# Patient Record
Sex: FEMALE | Race: BLACK OR AFRICAN AMERICAN | NOT HISPANIC OR LATINO | URBAN - METROPOLITAN AREA
[De-identification: names, ages, dates, MRNs, and addresses within clinical notes are randomized per-mention and may not be internally consistent; named-entity substitution may affect disease eponyms.]

---

## 2017-05-22 ENCOUNTER — OFFICE (OUTPATIENT)
Dept: URBAN - METROPOLITAN AREA CLINIC 75 | Facility: CLINIC | Age: 72
End: 2017-05-22

## 2017-05-22 VITALS
HEIGHT: 64 IN | SYSTOLIC BLOOD PRESSURE: 128 MMHG | HEART RATE: 72 BPM | DIASTOLIC BLOOD PRESSURE: 60 MMHG | WEIGHT: 155 LBS

## 2017-05-22 DIAGNOSIS — R10.13 EPIGASTRIC PAIN: ICD-10-CM

## 2017-05-22 DIAGNOSIS — Z86.010 PERSONAL HISTORY OF COLONIC POLYPS: ICD-10-CM

## 2017-05-22 DIAGNOSIS — K59.00 CONSTIPATION, UNSPECIFIED: ICD-10-CM

## 2017-05-22 DIAGNOSIS — R10.12 LEFT UPPER QUADRANT PAIN: ICD-10-CM

## 2017-05-22 PROCEDURE — 99214 OFFICE O/P EST MOD 30 MIN: CPT | Performed by: INTERNAL MEDICINE

## 2017-05-22 RX ORDER — OMEPRAZOLE 20 MG/1
CAPSULE, DELAYED RELEASE ORAL
Qty: 60 | Refills: 6 | Status: COMPLETED
End: 2019-10-04

## 2017-05-25 VITALS
SYSTOLIC BLOOD PRESSURE: 167 MMHG | HEIGHT: 64 IN | HEART RATE: 94 BPM | RESPIRATION RATE: 14 BRPM | DIASTOLIC BLOOD PRESSURE: 100 MMHG | SYSTOLIC BLOOD PRESSURE: 147 MMHG | OXYGEN SATURATION: 100 % | RESPIRATION RATE: 16 BRPM | HEART RATE: 64 BPM | TEMPERATURE: 96.9 F | WEIGHT: 155 LBS | HEART RATE: 63 BPM | HEART RATE: 60 BPM | SYSTOLIC BLOOD PRESSURE: 139 MMHG | HEART RATE: 66 BPM | DIASTOLIC BLOOD PRESSURE: 76 MMHG | OXYGEN SATURATION: 96 % | DIASTOLIC BLOOD PRESSURE: 77 MMHG | RESPIRATION RATE: 27 BRPM | SYSTOLIC BLOOD PRESSURE: 155 MMHG | OXYGEN SATURATION: 99 % | OXYGEN SATURATION: 60 % | DIASTOLIC BLOOD PRESSURE: 67 MMHG | SYSTOLIC BLOOD PRESSURE: 140 MMHG | SYSTOLIC BLOOD PRESSURE: 146 MMHG | HEART RATE: 68 BPM | DIASTOLIC BLOOD PRESSURE: 71 MMHG | DIASTOLIC BLOOD PRESSURE: 75 MMHG | HEART RATE: 65 BPM | OXYGEN SATURATION: 98 % | TEMPERATURE: 98.6 F | OXYGEN SATURATION: 94 %

## 2017-05-30 ENCOUNTER — AMBULATORY SURGICAL CENTER (OUTPATIENT)
Dept: URBAN - METROPOLITAN AREA SURGERY 17 | Facility: SURGERY | Age: 72
End: 2017-05-30
Payer: MEDICARE

## 2017-05-30 ENCOUNTER — OFFICE (OUTPATIENT)
Dept: URBAN - METROPOLITAN AREA CLINIC 64 | Facility: CLINIC | Age: 72
End: 2017-05-30
Payer: MEDICARE

## 2017-05-30 DIAGNOSIS — R10.13 EPIGASTRIC PAIN: ICD-10-CM

## 2017-05-30 DIAGNOSIS — K29.50 UNSPECIFIED CHRONIC GASTRITIS WITHOUT BLEEDING: ICD-10-CM

## 2017-05-30 DIAGNOSIS — R10.12 LEFT UPPER QUADRANT PAIN: ICD-10-CM

## 2017-05-30 DIAGNOSIS — K44.9 DIAPHRAGMATIC HERNIA WITHOUT OBSTRUCTION OR GANGRENE: ICD-10-CM

## 2017-05-30 DIAGNOSIS — K29.70 GASTRITIS, UNSPECIFIED, WITHOUT BLEEDING: ICD-10-CM

## 2017-05-30 DIAGNOSIS — K21.0 GASTRO-ESOPHAGEAL REFLUX DISEASE WITH ESOPHAGITIS: ICD-10-CM

## 2017-05-30 DIAGNOSIS — B96.81 HELICOBACTER PYLORI [H. PYLORI] AS THE CAUSE OF DISEASES CLA: ICD-10-CM

## 2017-05-30 LAB
GI HISTOLOGY: A. UNSPECIFIED: (no result)
GI HISTOLOGY: B. UNSPECIFIED: (no result)
GI HISTOLOGY: C. SELECT: (no result)
GI HISTOLOGY: D. UNSPECIFIED: (no result)
GI HISTOLOGY: PDF REPORT: (no result)

## 2017-05-30 PROCEDURE — 88342 IMHCHEM/IMCYTCHM 1ST ANTB: CPT | Performed by: INTERNAL MEDICINE

## 2017-05-30 PROCEDURE — 88341 IMHCHEM/IMCYTCHM EA ADD ANTB: CPT | Performed by: INTERNAL MEDICINE

## 2017-05-30 PROCEDURE — 88305 TISSUE EXAM BY PATHOLOGIST: CPT | Performed by: INTERNAL MEDICINE

## 2017-05-30 PROCEDURE — 43239 EGD BIOPSY SINGLE/MULTIPLE: CPT | Performed by: INTERNAL MEDICINE

## 2017-05-30 RX ADMIN — LIDOCAINE HYDROCHLORIDE 50 MG: 10 INJECTION, SOLUTION EPIDURAL; INFILTRATION; INTRACAUDAL; PERINEURAL at 10:57

## 2017-05-30 RX ADMIN — PROPOFOL 100 MG: 10 INJECTION, EMULSION INTRAVENOUS at 10:57

## 2017-05-30 RX ADMIN — PROPOFOL 25 MG: 10 INJECTION, EMULSION INTRAVENOUS at 11:00

## 2017-06-20 VITALS
RESPIRATION RATE: 10 BRPM | DIASTOLIC BLOOD PRESSURE: 74 MMHG | HEART RATE: 64 BPM | HEART RATE: 62 BPM | RESPIRATION RATE: 15 BRPM | DIASTOLIC BLOOD PRESSURE: 76 MMHG | TEMPERATURE: 97.1 F | OXYGEN SATURATION: 94 % | SYSTOLIC BLOOD PRESSURE: 139 MMHG | DIASTOLIC BLOOD PRESSURE: 94 MMHG | DIASTOLIC BLOOD PRESSURE: 80 MMHG | RESPIRATION RATE: 13 BRPM | HEART RATE: 60 BPM | DIASTOLIC BLOOD PRESSURE: 49 MMHG | OXYGEN SATURATION: 100 % | HEART RATE: 57 BPM | SYSTOLIC BLOOD PRESSURE: 135 MMHG | HEART RATE: 61 BPM | SYSTOLIC BLOOD PRESSURE: 134 MMHG | RESPIRATION RATE: 18 BRPM | HEART RATE: 58 BPM | DIASTOLIC BLOOD PRESSURE: 62 MMHG | DIASTOLIC BLOOD PRESSURE: 97 MMHG | SYSTOLIC BLOOD PRESSURE: 188 MMHG | DIASTOLIC BLOOD PRESSURE: 96 MMHG | DIASTOLIC BLOOD PRESSURE: 58 MMHG | OXYGEN SATURATION: 97 % | RESPIRATION RATE: 11 BRPM | WEIGHT: 155 LBS | SYSTOLIC BLOOD PRESSURE: 140 MMHG | RESPIRATION RATE: 9 BRPM | TEMPERATURE: 95 F | OXYGEN SATURATION: 99 % | SYSTOLIC BLOOD PRESSURE: 173 MMHG | SYSTOLIC BLOOD PRESSURE: 97 MMHG | SYSTOLIC BLOOD PRESSURE: 165 MMHG | HEART RATE: 55 BPM | HEART RATE: 56 BPM | DIASTOLIC BLOOD PRESSURE: 69 MMHG | SYSTOLIC BLOOD PRESSURE: 157 MMHG | HEIGHT: 64 IN | DIASTOLIC BLOOD PRESSURE: 71 MMHG | RESPIRATION RATE: 16 BRPM | SYSTOLIC BLOOD PRESSURE: 120 MMHG

## 2017-06-21 ENCOUNTER — AMBULATORY SURGICAL CENTER (OUTPATIENT)
Dept: URBAN - METROPOLITAN AREA SURGERY 17 | Facility: SURGERY | Age: 72
End: 2017-06-21
Payer: MEDICARE

## 2017-06-21 DIAGNOSIS — Z86.010 PERSONAL HISTORY OF COLONIC POLYPS: ICD-10-CM

## 2017-06-21 DIAGNOSIS — K57.30 DIVERTICULOSIS OF LARGE INTESTINE WITHOUT PERFORATION OR ABS: ICD-10-CM

## 2017-06-21 DIAGNOSIS — K64.8 OTHER HEMORRHOIDS: ICD-10-CM

## 2017-06-21 DIAGNOSIS — K59.00 CONSTIPATION, UNSPECIFIED: ICD-10-CM

## 2017-06-21 PROBLEM — Z12.11 SURVEILLANCE DUE TO PRIOR COLONIC NEOPLASIA: Status: ACTIVE | Noted: 2017-06-21

## 2017-06-21 PROCEDURE — 45378 DIAGNOSTIC COLONOSCOPY: CPT | Performed by: INTERNAL MEDICINE

## 2017-06-21 RX ADMIN — PROPOFOL 50 MG: 10 INJECTION, EMULSION INTRAVENOUS at 09:59

## 2017-06-21 RX ADMIN — PROPOFOL 50 MG: 10 INJECTION, EMULSION INTRAVENOUS at 10:05

## 2017-06-21 RX ADMIN — PROPOFOL 100 MG: 10 INJECTION, EMULSION INTRAVENOUS at 09:49

## 2017-06-21 RX ADMIN — PROPOFOL 25 MG: 10 INJECTION, EMULSION INTRAVENOUS at 09:55

## 2017-06-21 RX ADMIN — LIDOCAINE HYDROCHLORIDE 25 MG: 10 INJECTION, SOLUTION EPIDURAL; INFILTRATION; INTRACAUDAL; PERINEURAL at 09:49

## 2017-06-21 RX ADMIN — PROPOFOL 50 MG: 10 INJECTION, EMULSION INTRAVENOUS at 09:57

## 2017-06-21 RX ADMIN — PROPOFOL 25 MG: 10 INJECTION, EMULSION INTRAVENOUS at 09:52

## 2019-10-04 ENCOUNTER — OFFICE (OUTPATIENT)
Dept: URBAN - METROPOLITAN AREA CLINIC 75 | Facility: CLINIC | Age: 74
End: 2019-10-04

## 2019-10-04 VITALS
SYSTOLIC BLOOD PRESSURE: 112 MMHG | DIASTOLIC BLOOD PRESSURE: 73 MMHG | HEART RATE: 61 BPM | WEIGHT: 157 LBS | HEIGHT: 64 IN

## 2019-10-04 DIAGNOSIS — K57.90 DIVERTICULOSIS OF INTESTINE, PART UNSPECIFIED, WITHOUT PERFO: ICD-10-CM

## 2019-10-04 DIAGNOSIS — K59.00 CONSTIPATION, UNSPECIFIED: ICD-10-CM

## 2019-10-04 DIAGNOSIS — D50.0 IRON DEFICIENCY ANEMIA SECONDARY TO BLOOD LOSS (CHRONIC): ICD-10-CM

## 2019-10-04 DIAGNOSIS — R19.4 CHANGE IN BOWEL HABIT: ICD-10-CM

## 2019-10-04 DIAGNOSIS — I10 ESSENTIAL (PRIMARY) HYPERTENSION: ICD-10-CM

## 2019-10-04 DIAGNOSIS — D57.3 SICKLE-CELL TRAIT: ICD-10-CM

## 2019-10-04 DIAGNOSIS — R11.0 NAUSEA: ICD-10-CM

## 2019-10-04 PROCEDURE — 99214 OFFICE O/P EST MOD 30 MIN: CPT | Performed by: INTERNAL MEDICINE

## 2019-10-04 RX ORDER — CIPROFLOXACIN 500 MG/1
1000 TABLET, FILM COATED ORAL
Qty: 20 | Refills: 0 | Status: COMPLETED
Start: 2019-10-04 | End: 2020-04-29

## 2019-10-04 RX ORDER — METRONIDAZOLE 250 MG/1
750 TABLET, FILM COATED ORAL
Qty: 30 | Refills: 0 | Status: COMPLETED
Start: 2019-10-04 | End: 2020-04-29

## 2020-04-20 ENCOUNTER — INPATIENT HOSPITAL (OUTPATIENT)
Dept: URBAN - METROPOLITAN AREA HOSPITAL 107 | Facility: HOSPITAL | Age: 75
End: 2020-04-20
Payer: MEDICARE

## 2020-04-20 DIAGNOSIS — R93.3 ABNORMAL FINDINGS ON DIAGNOSTIC IMAGING OF OTHER PARTS OF DI: ICD-10-CM

## 2020-04-20 DIAGNOSIS — R11.2 NAUSEA WITH VOMITING, UNSPECIFIED: ICD-10-CM

## 2020-04-20 DIAGNOSIS — R10.84 GENERALIZED ABDOMINAL PAIN: ICD-10-CM

## 2020-04-20 PROCEDURE — 99223 1ST HOSP IP/OBS HIGH 75: CPT | Performed by: INTERNAL MEDICINE

## 2020-04-21 ENCOUNTER — INPATIENT HOSPITAL (OUTPATIENT)
Dept: URBAN - METROPOLITAN AREA HOSPITAL 107 | Facility: HOSPITAL | Age: 75
End: 2020-04-21
Payer: MEDICARE

## 2020-04-21 DIAGNOSIS — R10.84 GENERALIZED ABDOMINAL PAIN: ICD-10-CM

## 2020-04-21 DIAGNOSIS — K52.9 NONINFECTIVE GASTROENTERITIS AND COLITIS, UNSPECIFIED: ICD-10-CM

## 2020-04-21 DIAGNOSIS — K57.30 DIVERTICULOSIS OF LARGE INTESTINE WITHOUT PERFORATION OR ABS: ICD-10-CM

## 2020-04-21 PROCEDURE — 99232 SBSQ HOSP IP/OBS MODERATE 35: CPT | Performed by: PHYSICIAN ASSISTANT

## 2020-04-22 ENCOUNTER — INPATIENT HOSPITAL (OUTPATIENT)
Dept: URBAN - METROPOLITAN AREA HOSPITAL 107 | Facility: HOSPITAL | Age: 75
End: 2020-04-22
Payer: MEDICARE

## 2020-04-22 DIAGNOSIS — K56.609 UNSPECIFIED INTESTINAL OBSTRUCTION, UNSPECIFIED AS TO PARTIA: ICD-10-CM

## 2020-04-22 DIAGNOSIS — K57.30 DIVERTICULOSIS OF LARGE INTESTINE WITHOUT PERFORATION OR ABS: ICD-10-CM

## 2020-04-22 DIAGNOSIS — K52.9 NONINFECTIVE GASTROENTERITIS AND COLITIS, UNSPECIFIED: ICD-10-CM

## 2020-04-22 DIAGNOSIS — R10.84 GENERALIZED ABDOMINAL PAIN: ICD-10-CM

## 2020-04-22 PROCEDURE — 99231 SBSQ HOSP IP/OBS SF/LOW 25: CPT | Performed by: NURSE PRACTITIONER

## 2020-04-23 PROCEDURE — 99231 SBSQ HOSP IP/OBS SF/LOW 25: CPT | Performed by: NURSE PRACTITIONER

## 2020-04-24 PROCEDURE — 99231 SBSQ HOSP IP/OBS SF/LOW 25: CPT | Performed by: NURSE PRACTITIONER

## 2020-04-29 VITALS — HEIGHT: 64 IN | WEIGHT: 150 LBS

## 2020-04-30 ENCOUNTER — OFFICE (OUTPATIENT)
Dept: URBAN - METROPOLITAN AREA CLINIC 75 | Facility: CLINIC | Age: 75
End: 2020-04-30
Payer: MEDICARE

## 2020-04-30 DIAGNOSIS — K59.00 CONSTIPATION, UNSPECIFIED: ICD-10-CM

## 2020-04-30 DIAGNOSIS — R10.12 LEFT UPPER QUADRANT PAIN: ICD-10-CM

## 2020-04-30 DIAGNOSIS — R11.0 NAUSEA: ICD-10-CM

## 2020-04-30 DIAGNOSIS — Z86.010 PERSONAL HISTORY OF COLONIC POLYPS: ICD-10-CM

## 2020-04-30 DIAGNOSIS — R10.13 EPIGASTRIC PAIN: ICD-10-CM

## 2020-04-30 PROCEDURE — 99214 OFFICE O/P EST MOD 30 MIN: CPT | Mod: 95 | Performed by: INTERNAL MEDICINE

## 2020-04-30 RX ORDER — DICYCLOMINE HYDROCHLORIDE 10 MG/1
20 CAPSULE ORAL
Qty: 50 | Refills: 5 | Status: ACTIVE

## 2020-07-29 VITALS — WEIGHT: 158 LBS | HEIGHT: 64 IN

## 2020-07-30 ENCOUNTER — OFFICE (OUTPATIENT)
Dept: URBAN - METROPOLITAN AREA CLINIC 75 | Facility: CLINIC | Age: 75
End: 2020-07-30
Payer: MEDICARE

## 2020-07-30 DIAGNOSIS — K29.70 GASTRITIS, UNSPECIFIED, WITHOUT BLEEDING: ICD-10-CM

## 2020-07-30 DIAGNOSIS — R10.12 LEFT UPPER QUADRANT PAIN: ICD-10-CM

## 2020-07-30 DIAGNOSIS — R19.7 DIARRHEA, UNSPECIFIED: ICD-10-CM

## 2020-07-30 DIAGNOSIS — K57.30 DIVERTICULOSIS OF LARGE INTESTINE WITHOUT PERFORATION OR ABS: ICD-10-CM

## 2020-07-30 DIAGNOSIS — K64.8 OTHER HEMORRHOIDS: ICD-10-CM

## 2020-07-30 DIAGNOSIS — Z86.010 PERSONAL HISTORY OF COLONIC POLYPS: ICD-10-CM

## 2020-07-30 DIAGNOSIS — R11.0 NAUSEA: ICD-10-CM

## 2020-07-30 DIAGNOSIS — K44.9 DIAPHRAGMATIC HERNIA WITHOUT OBSTRUCTION OR GANGRENE: ICD-10-CM

## 2020-07-30 DIAGNOSIS — R10.13 EPIGASTRIC PAIN: ICD-10-CM

## 2020-07-30 DIAGNOSIS — K59.00 CONSTIPATION, UNSPECIFIED: ICD-10-CM

## 2020-07-30 PROCEDURE — G2012 BRIEF CHECK IN BY MD/QHP: HCPCS | Mod: 95 | Performed by: NURSE PRACTITIONER

## 2020-07-30 NOTE — SERVICEHPINOTES
MAINOR GILLESPIE is seen today for a follow-up visit. Office visit note from 7/16/14..............................I last evaluated this patient in the office on 4/7/2014. At that time she complained of pain in the epigastric region and the left upper quadrant. She had a total of 3 episodes of the pain over the past one year. Her current episode of pain had been present for approximately 3 weeks. On one episode of pain in the past, she was residing in [place] and she was treated for H. pylori. She's never had any type of workup for the pain, such as an EGD or CT scan. She had associated nausea but no vomiting. The pain in the epigastric region seemed to increase when she was placed on omeprazole. She was on ranitidine for one week, and the pain was 50 - 75% improved. She described the pain as a dull, aching sensation. She had frequent belching. She had burning in the substernal area, which she considered reflux on nearly a daily basis. She had no dysphagia.Her bowel movements are unchanged. She has occasional mild constipation. She states she had a colonoscopy 8-10 years ago, which revealed a possible polyp. She normally has one bowel movement per day. She has no melena or bright red blood per rectum.She has chronic anemia due to sickle cell trait. Her normal hemoglobin ranges between 10-11.CBC from 3/11/2014, revealed hemoglobin 10 with an MCV of 73. Her white count is 5.2, and platelet count 292,000. Her CMP reveals an AST of 12, ALT 8, alkaline phosphatase 55, and bilirubin 0.3. Her cholesterol is 215.This patient underwent an EGD on 4/30/2014 that revealed gastritis and a hiatal hernia. Biopsies were positive for H. pylori, and I treated her with Alinia and Biaxin. She's had complete resolution of her upper GI complaints since then. She remains on ranitidine. CT scan of her abdomen revealed atherosclerosis and diverticulosis.Patient had a TIA one month ago with numbness on the right side of her body that resolved after 24 hours.Office visit from 5/22/17.............................One month ago, Ms. Gillespie developed pain in the epigastric region and left upper quadrant. She described this as an aching pain and it occasionally became sharp. She states that a KUB was obtained and she was told that she had a large amount of gas in the intestines. She was not placed on any medications for the pain. She also states that the x-ray revealed some constipation. She started taking Miralax and it offered her no relief. She then started taking Colace every morning and she has been having bowel movements on a daily basis for the past 5 days. When she was experiencing the left upper quadrant pain and epigastric pain she had an 8-day period with no bowel movement. Prior to the onset of her symptoms, her bowel movements were occurring daily.Her left upper quadrant pain is resolved, unless she eats certain foods where it will recur. Her epigastric pain is less intense, but still present. She has no reflux symptoms or dysphagia.I asked about her Hydrocodone precipitating the episode of constipation. She did take 2 Hydrocodone preceding this event, but she took no more of the Hydrocodone after that.CBC reveals hemoglobin of 9.6, WC 6.5, and platelet count 160,000. This patient has sickle cell trait and maintains hemoglobin between 9 and 10. CMP reveals bili of 0.6, AST 26, ALT 21, alk phos 68, and albumin 5.4. Lipase was normal at 119. CT scan of the abdomen from 5/3/17 reveals sigmoid diverticulosis, but was otherwise negative. KUB from 5/6/17 reveals increase in non-dilated gas filled small bowel due to air swallowing, but no other abnormalities.Office visit note from 10/4/19...............................Ms. Gillespie states that she has noted “mushy stool” for the past two months. She is having 1-2 bowel movements per day. Six days ago the patient developed pain in the upper abdomen and left upper quadrant. She had 3-4 loose bowel movements for two days. Because of the pain and diarrhea she went to [facility]. Laboratories were obtained which revealed a CBC with a hemoglobin of 12.0, white count 5.3, platelet count 303,000. CMP was normal with an AST of 19, ALT 18, albumin 4.5, lipase 130. CT scan of her abdomen was obtained which revealed diverticulosis as well as subtle inflammatory changes in the proximal descending colon consistent with colitis or diverticulitis. No other abnormalities were noted. The patient was placed on Augment. She feels that her abdominal pain has improved. She has actually had no bowel movements for the past 4-5 days. She continues to have some nausea but no vomiting. She has had no melena or bright red blood per rectum.The patient underwent a colonoscopy in June 2017 that revealed diverticulosis and internal hemorrhoids. Her EGD in May 2017 revealed a hiatal hernia and gastritis as well as H Pylori infection and she received triple drug therapy for this.I feel that the patient has diverticulitis in the descending colon that is incompletely treated. She will need to have more aggressive therapy.Telemedicine visit on 4/30/20..............................… When the patient was last in the office in the Fall of 2019, we treated her with a round of ciprofloxacin and Flagyl therapy for diverticulitis, which was noted on CT scan in the proximal descending colon on 9/30/2019. We scheduled a follow-up upper GI / small bowel follow-through in October 2019 that revealed some mild esophageal dysmotility, but the small intestine was normal. We treated the patient on MiraLAX for constipation. Her Calprotectin stool analysis was borderline at 72.Patient was admitted to [facility] on 4/19/2020 with abdominal pain, nausea, vomiting, and diarrhea. CT scan revealed possible small bowel obstruction versus ileus. She had dilation of the small bowel with air-fluid levels. There was also evidence of mild colitis in the right side of the colon and transverse colon.Her laboratories revealed a hemoglobin 11.6 and a normal CMP.Patient was treated conservatively. She was discharged from the hospital 6 days ago. She did well but then 4 days ago had an episode of nausea, vomiting, diarrhea, so she switched back to a clear liquid diet. Since then she's had no further nausea or vomiting and actually no bowel movements. Her primary care physician placed her on another round of Cipro and Flagyl.______________________________________________________________________________________BRTelemedicine visit 7/30/2020 .............. I had the pleasure of seeing Ms. Gillespie via telemedicine. As you know, she is a pleasant 75-year-old female patient of Dr. Avila with a history of GERD, H. pylori, constipation, colon polyps. She was last seen in our office 4/30/2020, status post hospitalization for bowel obstruction vs ileus better with Cipro and Flagyl. Here today for bloating/fullness. She reports that when she eats she has a sensation that food sits in stomach with associated fullness and bloating. Symptoms will last 20 minutes then will belch several times with resolution of symptoms or takes OTC gas-X with benefit. Mild nausea with full sensation but no vomiting. She is not a diabetic. She is taking pantoprazole 40mg QD. She has tried supplementing with OTC omeprazole at HS to prevent AM symptoms to no avail. She reports 1 BM per day with mild constipation for which she will take stool softener PRN with benefit. She does take MiraLAX daily. Underwent normal colonoscopy 6/2017. She denies blood in her stool, melena, unexplained weight loss, diarrhea, rectal pain/itching/burning, rectal protrusions, fecal incontinence or incontinence.Tried: fiber (makes constipation worse) DATA REVIEWED:BREGD 5/30/2017 as below bx w/ reflux esophagitis, chronic gastritis, (+) H. pyloriBRCT A/P 4/19/2020BRUGI/SBFT 10/10/2019 mild esophageal dysmotility, otherwise normal

## 2021-06-30 ENCOUNTER — OFFICE (OUTPATIENT)
Dept: URBAN - METROPOLITAN AREA CLINIC 75 | Facility: CLINIC | Age: 76
End: 2021-06-30

## 2021-06-30 VITALS
SYSTOLIC BLOOD PRESSURE: 136 MMHG | HEIGHT: 64 IN | OXYGEN SATURATION: 99 % | HEART RATE: 66 BPM | RESPIRATION RATE: 16 BRPM | DIASTOLIC BLOOD PRESSURE: 82 MMHG | WEIGHT: 148 LBS

## 2021-06-30 DIAGNOSIS — R11.0 NAUSEA: ICD-10-CM

## 2021-06-30 DIAGNOSIS — K29.70 GASTRITIS, UNSPECIFIED, WITHOUT BLEEDING: ICD-10-CM

## 2021-06-30 DIAGNOSIS — R10.13 EPIGASTRIC PAIN: ICD-10-CM

## 2021-06-30 DIAGNOSIS — K59.00 CONSTIPATION, UNSPECIFIED: ICD-10-CM

## 2021-06-30 DIAGNOSIS — K57.30 DIVERTICULOSIS OF LARGE INTESTINE WITHOUT PERFORATION OR ABS: ICD-10-CM

## 2021-06-30 DIAGNOSIS — Z86.010 PERSONAL HISTORY OF COLONIC POLYPS: ICD-10-CM

## 2021-06-30 DIAGNOSIS — K44.9 DIAPHRAGMATIC HERNIA WITHOUT OBSTRUCTION OR GANGRENE: ICD-10-CM

## 2021-06-30 DIAGNOSIS — K64.8 OTHER HEMORRHOIDS: ICD-10-CM

## 2021-06-30 DIAGNOSIS — R10.12 LEFT UPPER QUADRANT PAIN: ICD-10-CM

## 2021-06-30 DIAGNOSIS — R19.7 DIARRHEA, UNSPECIFIED: ICD-10-CM

## 2021-06-30 PROCEDURE — 99214 OFFICE O/P EST MOD 30 MIN: CPT | Performed by: NURSE PRACTITIONER

## 2021-06-30 RX ORDER — OMEPRAZOLE 40 MG/1
80 CAPSULE, DELAYED RELEASE ORAL
Qty: 180 | Refills: 3 | Status: ACTIVE
Start: 2021-06-30

## 2021-06-30 RX ORDER — PANTOPRAZOLE SODIUM 40 MG/1
40 TABLET, DELAYED RELEASE ORAL
Qty: 30 | Refills: 11 | Status: COMPLETED
End: 2021-06-30